# Patient Record
Sex: FEMALE | Race: WHITE
[De-identification: names, ages, dates, MRNs, and addresses within clinical notes are randomized per-mention and may not be internally consistent; named-entity substitution may affect disease eponyms.]

---

## 2018-01-12 ENCOUNTER — HOSPITAL ENCOUNTER (OUTPATIENT)
Dept: HOSPITAL 89 - LAB | Age: 38
End: 2018-01-12
Attending: NURSE PRACTITIONER
Payer: COMMERCIAL

## 2018-01-12 DIAGNOSIS — G43.821: Primary | ICD-10-CM

## 2018-01-12 PROCEDURE — 36415 COLL VENOUS BLD VENIPUNCTURE: CPT

## 2018-01-12 PROCEDURE — 83001 ASSAY OF GONADOTROPIN (FSH): CPT

## 2018-01-12 PROCEDURE — 82670 ASSAY OF TOTAL ESTRADIOL: CPT

## 2018-01-12 PROCEDURE — 84403 ASSAY OF TOTAL TESTOSTERONE: CPT

## 2018-04-10 ENCOUNTER — HOSPITAL ENCOUNTER (OUTPATIENT)
Dept: HOSPITAL 89 - US | Age: 38
End: 2018-04-10
Attending: NURSE PRACTITIONER
Payer: COMMERCIAL

## 2018-04-10 DIAGNOSIS — M79.661: Primary | ICD-10-CM

## 2018-04-10 NOTE — RADIOLOGY IMAGING REPORT
FACILITY: [facility] 

 

PATIENT NAME: Lizzy Garcia

: 1980

MR: [id number]

V: [id number]

EXAM DATE: 

ORDERING PHYSICIAN: RAGHAV WOOTEN

TECHNOLOGIST: 

 

Location: [facility] [place]

Patient: Lizzy Garcia

: 1980

MRN: [id number]

Visit/Account:[id number]

Date of Sevice:  4/10/2018

 

ACCESSION #: [id number]

 

EXAMINATION: VENOUS DOPP LOW RIGHT EXTREMIT

 

COMPARISON: None Available

 

HISTORY: Right calf pain.

 

FINDINGS: Standard right lower extremity Doppler ultrasound with color flow and spectral analysis is 
performed.

 

The common femoral, femoral, and popliteal veins are widely patent and compress appropriately.  The v
isualized calf veins and the proximal greater saphenous vein are patent.

 

No popliteal fluid collection. The contralateral common femoral vein is patent.

 

IMPRESSION:

 

No right lower extremity deep venous thrombosis.

 

Report Dictated By: Roberto Cross MD at 4/10/2018 5:25 PM

 

Report E-Signed By: Roberto Cross MD  at 4/10/2018 5:26 PM

 

WSN:[id number]

## 2018-12-04 ENCOUNTER — HOSPITAL ENCOUNTER (OUTPATIENT)
Dept: HOSPITAL 89 - ZZGRANDUC | Age: 38
End: 2018-12-04
Attending: NURSE PRACTITIONER
Payer: COMMERCIAL

## 2018-12-04 DIAGNOSIS — R07.9: Primary | ICD-10-CM

## 2018-12-04 LAB — PLATELET COUNT, AUTOMATED: 294 K/UL (ref 150–450)

## 2018-12-04 PROCEDURE — 84155 ASSAY OF PROTEIN SERUM: CPT

## 2018-12-04 PROCEDURE — 82247 BILIRUBIN TOTAL: CPT

## 2018-12-04 PROCEDURE — 84295 ASSAY OF SERUM SODIUM: CPT

## 2018-12-04 PROCEDURE — 82040 ASSAY OF SERUM ALBUMIN: CPT

## 2018-12-04 PROCEDURE — 85025 COMPLETE CBC W/AUTO DIFF WBC: CPT

## 2018-12-04 PROCEDURE — 85379 FIBRIN DEGRADATION QUANT: CPT

## 2018-12-04 PROCEDURE — 82947 ASSAY GLUCOSE BLOOD QUANT: CPT

## 2018-12-04 PROCEDURE — 84132 ASSAY OF SERUM POTASSIUM: CPT

## 2018-12-04 PROCEDURE — 82374 ASSAY BLOOD CARBON DIOXIDE: CPT

## 2018-12-04 PROCEDURE — 84450 TRANSFERASE (AST) (SGOT): CPT

## 2018-12-04 PROCEDURE — 84075 ASSAY ALKALINE PHOSPHATASE: CPT

## 2018-12-04 PROCEDURE — 84520 ASSAY OF UREA NITROGEN: CPT

## 2018-12-04 PROCEDURE — 82565 ASSAY OF CREATININE: CPT

## 2018-12-04 PROCEDURE — 82310 ASSAY OF CALCIUM: CPT

## 2018-12-04 PROCEDURE — 82435 ASSAY OF BLOOD CHLORIDE: CPT

## 2018-12-04 PROCEDURE — 84460 ALANINE AMINO (ALT) (SGPT): CPT

## 2018-12-05 ENCOUNTER — HOSPITAL ENCOUNTER (OUTPATIENT)
Dept: HOSPITAL 89 - CT | Age: 38
End: 2018-12-05
Attending: NURSE PRACTITIONER
Payer: COMMERCIAL

## 2018-12-05 DIAGNOSIS — R07.9: ICD-10-CM

## 2018-12-05 DIAGNOSIS — I26.99: Primary | ICD-10-CM

## 2018-12-05 DIAGNOSIS — R06.02: ICD-10-CM

## 2018-12-05 DIAGNOSIS — R79.1: ICD-10-CM

## 2018-12-05 PROCEDURE — 71275 CT ANGIOGRAPHY CHEST: CPT

## 2018-12-05 NOTE — RADIOLOGY IMAGING REPORT
FACILITY: [facility] [place] 

 

PATIENT NAME: Lizzy Garcia

: 1980

MR: [id number]

V: [id number]

EXAM DATE: [id number]

ORDERING PHYSICIAN: CLARA CARLSON

TECHNOLOGIST: 

 

Location: [facility]

Patient: Lizzy Garcia

: 1980

MRN: [id number]

Visit/Account:[id number]

Date of Sevice: 2018

 

ACCESSION #: [id number]

 

CT ANGIOGRAM OF THE CHEST WITH INTRAVENOUS CONTRAST, PE PROTOCOL

 

DATE OF EXAM: 2018 14:45

 

COMPARISON: Chest radiographs 2008.

 

INDICATION:  Elevated d-dimer.

 

TECHNIQUE: Contrast enhanced chest CT performed during the injection of 75 ml of Isovue-370.  Three-d
imensional (MIP) reconstructions were performed.

 

FINDINGS:

 

There is no pulmonary arterial filling defect.  No effusion, consolidation, or pneumothorax.  Minimal
 scattered atelectasis.

 

Thyroid:  Incompletely imaged thyroid.

 

Thoracic inlet:  No thoracic lymphadenopathy.

 

Heart and great vessels:  Heart size is normal.  Normal course and caliber of the great vessels.

 

Mediastinum and kenyatta:  No mediastinal or hilar adenopathy.

 

Lungs and pleura:  As above.

 

Breast and axilla:  Breast tissue is incompletely imaged but grossly unremarkable by CT.

 

Bones and soft tissues:  No acute osseous abnormality

 

 

 

IMPRESSION:

Negative for pulmonary arterial embolus.

 

 

 

 

One of the following dose optimization techniques was utilized in the performance of this exam: Autom
ated exposure control; adjustment of the mA and/or kV according to the patient's size; or use of an i
terative  reconstruction technique.  Specific details can be referenced in the facility's radiology C
T exam operational policy.

 

 

 

Report Dictated By: Oli Chavez MD at 2018 3:59 PM

 

Report E-Signed By: Oli Chavez MD  at 2018 4:09 PM

 

WSN:[id number]

## 2019-04-15 ENCOUNTER — HOSPITAL ENCOUNTER (OUTPATIENT)
Dept: HOSPITAL 89 - LAB | Age: 39
End: 2019-04-15
Attending: NURSE PRACTITIONER
Payer: COMMERCIAL

## 2019-04-15 DIAGNOSIS — R79.89: Primary | ICD-10-CM

## 2019-04-15 LAB
LDLC SERPL-MCNC: 82 MG/DL
PLATELET COUNT, AUTOMATED: 277 K/UL (ref 150–450)

## 2019-04-15 PROCEDURE — 82947 ASSAY GLUCOSE BLOOD QUANT: CPT

## 2019-04-15 PROCEDURE — 82670 ASSAY OF TOTAL ESTRADIOL: CPT

## 2019-04-15 PROCEDURE — 84478 ASSAY OF TRIGLYCERIDES: CPT

## 2019-04-15 PROCEDURE — 82247 BILIRUBIN TOTAL: CPT

## 2019-04-15 PROCEDURE — 84450 TRANSFERASE (AST) (SGOT): CPT

## 2019-04-15 PROCEDURE — 82310 ASSAY OF CALCIUM: CPT

## 2019-04-15 PROCEDURE — 84520 ASSAY OF UREA NITROGEN: CPT

## 2019-04-15 PROCEDURE — 84295 ASSAY OF SERUM SODIUM: CPT

## 2019-04-15 PROCEDURE — 82565 ASSAY OF CREATININE: CPT

## 2019-04-15 PROCEDURE — 84443 ASSAY THYROID STIM HORMONE: CPT

## 2019-04-15 PROCEDURE — 84155 ASSAY OF PROTEIN SERUM: CPT

## 2019-04-15 PROCEDURE — 84460 ALANINE AMINO (ALT) (SGPT): CPT

## 2019-04-15 PROCEDURE — 84403 ASSAY OF TOTAL TESTOSTERONE: CPT

## 2019-04-15 PROCEDURE — 36415 COLL VENOUS BLD VENIPUNCTURE: CPT

## 2019-04-15 PROCEDURE — 82374 ASSAY BLOOD CARBON DIOXIDE: CPT

## 2019-04-15 PROCEDURE — 82040 ASSAY OF SERUM ALBUMIN: CPT

## 2019-04-15 PROCEDURE — 82465 ASSAY BLD/SERUM CHOLESTEROL: CPT

## 2019-04-15 PROCEDURE — 84075 ASSAY ALKALINE PHOSPHATASE: CPT

## 2019-04-15 PROCEDURE — 83718 ASSAY OF LIPOPROTEIN: CPT

## 2019-04-15 PROCEDURE — 82435 ASSAY OF BLOOD CHLORIDE: CPT

## 2019-04-15 PROCEDURE — 83001 ASSAY OF GONADOTROPIN (FSH): CPT

## 2019-04-15 PROCEDURE — 85027 COMPLETE CBC AUTOMATED: CPT

## 2019-04-15 PROCEDURE — 84132 ASSAY OF SERUM POTASSIUM: CPT
